# Patient Record
Sex: MALE | Race: BLACK OR AFRICAN AMERICAN | Employment: UNEMPLOYED | URBAN - METROPOLITAN AREA
[De-identification: names, ages, dates, MRNs, and addresses within clinical notes are randomized per-mention and may not be internally consistent; named-entity substitution may affect disease eponyms.]

---

## 2023-10-16 ENCOUNTER — HOSPITAL ENCOUNTER (EMERGENCY)
Age: 43
Discharge: HOME | End: 2023-10-16
Attending: EMERGENCY MEDICINE
Payer: COMMERCIAL

## 2023-10-16 ENCOUNTER — APPOINTMENT (OUTPATIENT)
Dept: CT IMAGING | Age: 43
End: 2023-10-16
Payer: COMMERCIAL

## 2023-10-16 VITALS
RESPIRATION RATE: 16 BRPM | DIASTOLIC BLOOD PRESSURE: 101 MMHG | TEMPERATURE: 98.4 F | SYSTOLIC BLOOD PRESSURE: 159 MMHG | HEART RATE: 80 BPM | OXYGEN SATURATION: 99 %

## 2023-10-16 DIAGNOSIS — H20.9 IRITIS: Primary | ICD-10-CM

## 2023-10-16 PROCEDURE — 6360000004 HC RX CONTRAST MEDICATION: Performed by: EMERGENCY MEDICINE

## 2023-10-16 PROCEDURE — 99285 EMERGENCY DEPT VISIT HI MDM: CPT

## 2023-10-16 PROCEDURE — 6360000002 HC RX W HCPCS: Performed by: EMERGENCY MEDICINE

## 2023-10-16 PROCEDURE — 70481 CT ORBIT/EAR/FOSSA W/DYE: CPT

## 2023-10-16 PROCEDURE — 6370000000 HC RX 637 (ALT 250 FOR IP): Performed by: EMERGENCY MEDICINE

## 2023-10-16 PROCEDURE — 96374 THER/PROPH/DIAG INJ IV PUSH: CPT

## 2023-10-16 RX ORDER — TETRACAINE HYDROCHLORIDE 5 MG/ML
2 SOLUTION OPHTHALMIC ONCE
Status: COMPLETED | OUTPATIENT
Start: 2023-10-16 | End: 2023-10-16

## 2023-10-16 RX ORDER — FENTANYL CITRATE 50 UG/ML
50 INJECTION, SOLUTION INTRAMUSCULAR; INTRAVENOUS ONCE
Status: COMPLETED | OUTPATIENT
Start: 2023-10-16 | End: 2023-10-16

## 2023-10-16 RX ORDER — PREDNISOLONE ACETATE 10 MG/ML
1 SUSPENSION/ DROPS OPHTHALMIC 4 TIMES DAILY
Qty: 5 ML | Refills: 0 | Status: SHIPPED | OUTPATIENT
Start: 2023-10-16 | End: 2023-10-21

## 2023-10-16 RX ORDER — CYCLOPENTOLATE HYDROCHLORIDE 10 MG/ML
1 SOLUTION/ DROPS OPHTHALMIC ONCE
Status: COMPLETED | OUTPATIENT
Start: 2023-10-16 | End: 2023-10-16

## 2023-10-16 RX ORDER — CYCLOPENTOLATE HYDROCHLORIDE 20 MG/ML
2 SOLUTION/ DROPS OPHTHALMIC 2 TIMES DAILY
Qty: 1 ML | Refills: 0 | Status: SHIPPED | OUTPATIENT
Start: 2023-10-16 | End: 2023-10-21

## 2023-10-16 RX ADMIN — FLUORESCEIN SODIUM 1 MG: 1 STRIP OPHTHALMIC at 11:05

## 2023-10-16 RX ADMIN — FENTANYL CITRATE 50 MCG: 50 INJECTION, SOLUTION INTRAMUSCULAR; INTRAVENOUS at 11:05

## 2023-10-16 RX ADMIN — CYCLOPENTOLATE HYDROCHLORIDE 1 DROP: 10 SOLUTION OPHTHALMIC at 12:01

## 2023-10-16 RX ADMIN — TETRACAINE HYDROCHLORIDE 2 DROP: 5 SOLUTION OPHTHALMIC at 11:05

## 2023-10-16 RX ADMIN — IOPAMIDOL 75 ML: 755 INJECTION, SOLUTION INTRAVENOUS at 11:37

## 2023-10-16 ASSESSMENT — ENCOUNTER SYMPTOMS
SHORTNESS OF BREATH: 0
COUGH: 0
EYE REDNESS: 1
EYE PAIN: 1
ABDOMINAL PAIN: 0
PHOTOPHOBIA: 1
VOMITING: 0
NAUSEA: 0
EYE DISCHARGE: 1

## 2023-10-16 ASSESSMENT — PAIN DESCRIPTION - ORIENTATION: ORIENTATION: RIGHT

## 2023-10-16 ASSESSMENT — VISUAL ACUITY
OS: 20/20
OU: 20/50
OD: 0

## 2023-10-16 ASSESSMENT — PAIN DESCRIPTION - LOCATION: LOCATION: EYE

## 2023-10-16 ASSESSMENT — PAIN SCALES - GENERAL: PAINLEVEL_OUTOF10: 10

## 2023-10-16 NOTE — ED PROVIDER NOTES
[address] [place] [address] ED  Emergency Department Encounter  Emergency Medicine Resident     Pt Marty Haro  MRN: [id number]  [facility] 1980  Date of evaluation: 10/16/23  PCP:  No primary care provider on file. Note Started: 10:35 AM EDT      CHIEF COMPLAINT       Chief Complaint   Patient presents with    Eye Pain     Right eye       HISTORY OF PRESENT ILLNESS  (Location/Symptom, Timing/Onset, Context/Setting, Quality, Duration, Modifying Factors, Severity.)      Fernandez Diez is a 37 y.o. male who presents with right eye pain ongoing for the last 3 weeks. Patient has a history of recurrent styes, however states this feels different. Patient was seen at urgent care, given erythromycin ointment and has had no improvement of symptoms. Patient states he has significant pain with bright light. PAST MEDICAL / SURGICAL / SOCIAL / FAMILY HISTORY      has no past medical history on file. has no past surgical history on file. Social History     Socioeconomic History    Marital status: Single     Spouse name: Not on file    Number of children: Not on file    Years of education: Not on file    Highest education level: Not on file   Occupational History    Not on file   Tobacco Use    Smoking status: Every Day     Types: Cigarettes    Smokeless tobacco: Not on file   Substance and Sexual Activity    Alcohol use: Yes    Drug use: No    Sexual activity: Yes     Partners: Female   Other Topics Concern    Not on file   Social History Narrative    Not on file     Social Determinants of Health     Financial Resource Strain: Not on file   Food Insecurity: Not on file   Transportation Needs: Not on file   Physical Activity: Not on file   Stress: Not on file   Social Connections: Not on file   Intimate Partner Violence: Not on file   Housing Stability: Not on file       No family history on file. Allergies:  Patient has no known allergies.     Home Medications:  Prior to Admission medications
Respirations: 16  Is unable to open the eye is significantly red has consensual photophobia patient does have possibly some clouding of the cornea immediate call to Optho present. Patient's intraocular pressures were measured and normal in this eye unable to get visual acuity of this eye due to patient's inability to open the eye due to photophobia      Comments  Medical Decision Making  Amount and/or Complexity of Data Reviewed  Radiology: ordered. Risk  Prescription drug management. Consult placed to ophthalmology for possible iritis versus uveitis cycloplegics ordered to hopefully get a better look at the eye we will assess for corneal abrasion/ulcer as soon as we can get medication for pain control    ED Course as of 10/16/23 1816   Mon Oct 16, 2023   1143 Intraocular pressure 11 and right eye, 14 and left eye. 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 Travis Ar with ophthalmology. Confirm no foreign body  Treat iritis with cyclogel 2-3xd, prednisolone acetat 4xd for ___ days [BL]   2014 Lamp exam reapplied after medications were used patient has no cell and flare there is significant limbic irritation on the inferior margin however no pus can be seen image for MyChart placed for ophthalmology [WK]   9280 CT of the orbits negative  [BL]      ED Course User Index  [BL] Leo Becerra DO  [WK] DO Trisha Charles DO,, DO, RDMS.   Attending Emergency Physician          Trisha Menezes DO  10/16/23 1816

## 2023-10-16 NOTE — ED TRIAGE NOTES
Pt complains of right eye pain, pt states placedon eye antibiotic x 2 weeks and pain is getting worse

## 2023-10-16 NOTE — DISCHARGE INSTRUCTIONS
You are seen in the ER today for your eye pain. This is something called iritis, and inflammation of the the color structure of the eye. We did a CT scan which was negative for anything acute that is easily visible on that type of imaging. There was no evidence of foreign body or scratch the surface of the eye. We will discharge you with a prescription for a steroid eyedrop as well as a medication called Cyclogyl. Please use these as prescribed for 5 days. Please follow-up with Dr. Madina Kirk, an ophthalmologist within the next 3 days to be reassessed. If your pain does not improve, please feel free to return to the emergency department to be reassessed. PLEASE RETURN TO THE EMERGENCY DEPARTMENT IMMEDIATELY if you develop any concerning symptoms such as: chest pain, shortness of breath, feeling like your heart is racing, high fever not relieved by acetaminophen (Tylenol) and/or ibuprofen (Motrin / Advil), chills, persistent nausea and/or vomiting, loss of consciousness, numbness, weakness or tingling in the arms or legs or change in color of the extremities, changes in mental status, persistent or severe headache, blurry vision, loss of bladder / bowel control, unable to follow up with your physician, or other any other care or concern.